# Patient Record
Sex: MALE | Race: WHITE | NOT HISPANIC OR LATINO | Employment: OTHER | URBAN - NONMETROPOLITAN AREA
[De-identification: names, ages, dates, MRNs, and addresses within clinical notes are randomized per-mention and may not be internally consistent; named-entity substitution may affect disease eponyms.]

---

## 2024-03-05 ENCOUNTER — TELEPHONE (OUTPATIENT)
Dept: GASTROENTEROLOGY | Facility: CLINIC | Age: 84
End: 2024-03-05
Payer: MEDICARE

## 2024-03-05 NOTE — TELEPHONE ENCOUNTER
3/5-called patient to scheduled, no answer. I could not leave message due to VM being full. Scanned referral into chart

## 2024-06-19 ENCOUNTER — HOSPITAL ENCOUNTER (OUTPATIENT)
Dept: RADIOLOGY | Facility: HOSPITAL | Age: 84
Discharge: HOME | End: 2024-06-19
Payer: OTHER GOVERNMENT

## 2024-06-19 DIAGNOSIS — R63.4 ABNORMAL WEIGHT LOSS: ICD-10-CM

## 2024-06-19 PROCEDURE — 2550000001 HC RX 255 CONTRASTS

## 2024-06-19 PROCEDURE — A9698 NON-RAD CONTRAST MATERIALNOC: HCPCS

## 2024-06-19 PROCEDURE — 74177 CT ABD & PELVIS W/CONTRAST: CPT

## 2024-06-21 ENCOUNTER — HOSPITAL ENCOUNTER (OUTPATIENT)
Dept: RADIOLOGY | Facility: HOSPITAL | Age: 84
Discharge: HOME | End: 2024-06-21
Payer: OTHER GOVERNMENT

## 2024-06-21 DIAGNOSIS — R13.19 ESOPHAGEAL DYSPHAGIA: Primary | ICD-10-CM

## 2024-06-21 DIAGNOSIS — K21.9 GASTRO-ESOPHAGEAL REFLUX DISEASE WITHOUT ESOPHAGITIS: ICD-10-CM

## 2024-06-21 PROCEDURE — 2500000005 HC RX 250 GENERAL PHARMACY W/O HCPCS

## 2024-06-21 PROCEDURE — 92611 MOTION FLUOROSCOPY/SWALLOW: CPT | Mod: GN | Performed by: SPEECH-LANGUAGE PATHOLOGIST

## 2024-06-21 PROCEDURE — 74230 X-RAY XM SWLNG FUNCJ C+: CPT

## 2024-06-21 NOTE — PROCEDURES
"Speech-Language Pathology    Modified Barium Swallow Study     Patient Name: Genaro Culver  MRN: [id number]  : 1940  Today's Date: 24        Time Calculation  Start Time: 835  Stop Time: 905  Time Calculation (min): 30 min      Recommendations:  Diet: Regular texture diet and thin liquids  Compensatory Strategies: GERD precautions, small bites/sips, alternate liquids and solids, double swallow, intermittent throat clear    Assessment/Impression:    Full detailed SLP/Radiologist Modified Barium Swallow study report, titled \"[place] Modified Barium Swallow Study\", can be found under Imaging in the Chart Review tab.    Pt. Presenting with mild esophageal dysphagia marked by mild esophageal retention with minimal retrograde flow.     SLP Outcome Measures:  EAT 10  My swallowing problem has caused me to lose weight.: 0  My swallowing problem interferes with my ability to go out for meals.: 0  Swallowing liquids takes extra effort.: 1  Swallowing solids takes extra effort.: 1  Swallowing pills takes extra effort.: 1  Swallowing is painful: 0  The pleasure of eating is affected by my swallowing.: 0  When I swallow food sticks in my throat.: 1  I cough when I eat.: 0  Swallowing is stressful: 0  EAT-10 TOTAL SCORE:: 4    Plan:  SLP Plan: No treatment needs identified at this time     Discussed POC: Patient  Discussed Risks/Benefits: Yes  Patient/Caregiver Agreeable: Yes    Pain:   Rating 0-10: 0  Location: NA       Education:   Pt. Educated on results of MBS study, recommended diet and recommended safe swallow strategies.  Verbal understanding given   "

## 2025-01-07 ENCOUNTER — TELEPHONE (OUTPATIENT)
Dept: GASTROENTEROLOGY | Facility: CLINIC | Age: 85
End: 2025-01-07
Payer: MEDICARE

## 2025-01-07 NOTE — TELEPHONE ENCOUNTER
1/7/25-after receiving referral [facility] for [facility] to get [facility]. I called patient. A lady answered and said he was unavailable to come to the phone. I gave her a message for him to call us back. Scanned referral/approved auth into chart.

## 2025-01-08 DIAGNOSIS — R93.3 ABNORMAL FINDINGS ON RADIOLOGICAL EXAMINATION OF GASTROINTESTINAL TRACT: ICD-10-CM

## 2025-02-13 RX ORDER — AMOXICILLIN 250 MG
2 CAPSULE ORAL 2 TIMES DAILY
COMMUNITY
Start: 2024-04-12

## 2025-02-13 RX ORDER — CLOPIDOGREL BISULFATE 75 MG/1
75 TABLET ORAL
COMMUNITY
Start: 2024-02-07

## 2025-02-13 RX ORDER — METHOCARBAMOL 500 MG/1
1 TABLET, FILM COATED ORAL 2 TIMES DAILY PRN
COMMUNITY
Start: 2023-10-30

## 2025-02-13 RX ORDER — BUDESONIDE AND FORMOTEROL FUMARATE DIHYDRATE 160; 4.5 UG/1; UG/1
2 AEROSOL RESPIRATORY (INHALATION) 2 TIMES DAILY
COMMUNITY

## 2025-02-13 RX ORDER — FLUTICASONE PROPIONATE 50 MCG
SPRAY, SUSPENSION (ML) NASAL
COMMUNITY
Start: 2023-09-27

## 2025-02-13 RX ORDER — FINASTERIDE 5 MG/1
5 TABLET, FILM COATED ORAL
COMMUNITY
Start: 2024-04-12

## 2025-02-13 RX ORDER — RIVASTIGMINE 4.6 MG/24H
PATCH, EXTENDED RELEASE TRANSDERMAL
COMMUNITY
Start: 2024-04-12

## 2025-02-13 RX ORDER — CALC/MAG/B COMPLEX/D3/HERB 61
30 TABLET ORAL
COMMUNITY
Start: 2024-04-12

## 2025-02-13 RX ORDER — PROCHLORPERAZINE MALEATE 10 MG
10 TABLET ORAL
COMMUNITY
Start: 2024-04-12

## 2025-02-13 RX ORDER — ALBUTEROL SULFATE 90 UG/1
INHALANT RESPIRATORY (INHALATION)
COMMUNITY
Start: 2023-10-30

## 2025-02-13 RX ORDER — VIT A/VIT C/VIT E/ZINC/COPPER 4296-226
1 CAPSULE ORAL 2 TIMES DAILY
COMMUNITY
Start: 2023-06-28

## 2025-02-13 RX ORDER — ONDANSETRON 4 MG/1
4 TABLET, ORALLY DISINTEGRATING ORAL EVERY 8 HOURS PRN
COMMUNITY
Start: 2022-11-10

## 2025-02-13 RX ORDER — SUCRALFATE 1 G/10ML
SUSPENSION ORAL
COMMUNITY
Start: 2024-03-13

## 2025-02-13 RX ORDER — POLYETHYLENE GLYCOL 3350 17 G/17G
POWDER, FOR SOLUTION ORAL
COMMUNITY
Start: 2024-04-12

## 2025-02-13 RX ORDER — TAMSULOSIN HYDROCHLORIDE 0.4 MG/1
0.8 CAPSULE ORAL
COMMUNITY
Start: 2024-04-12

## 2025-02-13 RX ORDER — ATORVASTATIN CALCIUM 80 MG/1
1 TABLET, FILM COATED ORAL NIGHTLY
COMMUNITY
Start: 2024-04-12

## 2025-02-13 RX ORDER — ACETAMINOPHEN 500 MG
5 TABLET ORAL
COMMUNITY

## 2025-02-13 RX ORDER — ACETAMINOPHEN 325 MG/1
650 TABLET ORAL
COMMUNITY
Start: 2024-04-12

## 2025-02-13 RX ORDER — ISOSORBIDE MONONITRATE 30 MG/1
30 TABLET, EXTENDED RELEASE ORAL
COMMUNITY
Start: 2024-02-26

## 2025-02-13 RX ORDER — FLUTICASONE PROPIONATE AND SALMETEROL 250; 50 UG/1; UG/1
POWDER RESPIRATORY (INHALATION)
COMMUNITY
Start: 2023-11-29

## 2025-02-13 RX ORDER — GABAPENTIN 100 MG/1
CAPSULE ORAL
COMMUNITY

## 2025-02-13 RX ORDER — ASPIRIN 81 MG/1
81 TABLET ORAL
COMMUNITY

## 2025-02-13 RX ORDER — RABEPRAZOLE SODIUM 20 MG/1
TABLET, DELAYED RELEASE ORAL
COMMUNITY

## 2025-02-13 RX ORDER — NITROGLYCERIN 0.4 MG/1
0.4 TABLET SUBLINGUAL
COMMUNITY
Start: 2023-09-13

## 2025-02-13 RX ORDER — LORATADINE 10 MG/1
10 TABLET ORAL
COMMUNITY
Start: 2023-10-20

## 2025-02-19 ENCOUNTER — HOSPITAL ENCOUNTER (OUTPATIENT)
Dept: GASTROENTEROLOGY | Facility: CLINIC | Age: 85
Discharge: HOME | End: 2025-02-19
Payer: OTHER GOVERNMENT

## 2025-02-19 VITALS
BODY MASS INDEX: 20.97 KG/M2 | HEART RATE: 55 BPM | DIASTOLIC BLOOD PRESSURE: 68 MMHG | HEIGHT: 65 IN | RESPIRATION RATE: 16 BRPM | TEMPERATURE: 96.9 F | SYSTOLIC BLOOD PRESSURE: 120 MMHG | WEIGHT: 125.88 LBS | OXYGEN SATURATION: 95 %

## 2025-02-19 DIAGNOSIS — R93.3 ABNORMAL FINDINGS ON RADIOLOGICAL EXAMINATION OF GASTROINTESTINAL TRACT: ICD-10-CM

## 2025-02-19 PROCEDURE — 45378 DIAGNOSTIC COLONOSCOPY: CPT | Performed by: INTERNAL MEDICINE

## 2025-02-19 PROCEDURE — 3700000012 HC SEDATION LEVEL 5+ TIME - INITIAL 15 MINUTES 5/> YEARS: Performed by: INTERNAL MEDICINE

## 2025-02-19 PROCEDURE — G0121 COLON CA SCRN NOT HI RSK IND: HCPCS | Performed by: INTERNAL MEDICINE

## 2025-02-19 PROCEDURE — 2500000004 HC RX 250 GENERAL PHARMACY W/ HCPCS (ALT 636 FOR OP/ED): Performed by: INTERNAL MEDICINE

## 2025-02-19 PROCEDURE — 99152 MOD SED SAME PHYS/QHP 5/>YRS: CPT | Performed by: INTERNAL MEDICINE

## 2025-02-19 PROCEDURE — 7100000009 HC PHASE TWO TIME - INITIAL BASE CHARGE: Performed by: INTERNAL MEDICINE

## 2025-02-19 PROCEDURE — 7100000010 HC PHASE TWO TIME - EACH INCREMENTAL 1 MINUTE: Performed by: INTERNAL MEDICINE

## 2025-02-19 RX ORDER — MEPERIDINE HYDROCHLORIDE 25 MG/ML
INJECTION INTRAMUSCULAR; INTRAVENOUS; SUBCUTANEOUS AS NEEDED
Status: COMPLETED | OUTPATIENT
Start: 2025-02-19 | End: 2025-02-19

## 2025-02-19 RX ORDER — MIDAZOLAM HYDROCHLORIDE 5 MG/ML
INJECTION, SOLUTION INTRAMUSCULAR; INTRAVENOUS AS NEEDED
Status: COMPLETED | OUTPATIENT
Start: 2025-02-19 | End: 2025-02-19

## 2025-02-19 RX ORDER — SODIUM CHLORIDE, SODIUM LACTATE, POTASSIUM CHLORIDE, CALCIUM CHLORIDE 600; 310; 30; 20 MG/100ML; MG/100ML; MG/100ML; MG/100ML
75 INJECTION, SOLUTION INTRAVENOUS CONTINUOUS
Status: DISCONTINUED | OUTPATIENT
Start: 2025-02-19 | End: 2025-02-20 | Stop reason: HOSPADM

## 2025-02-19 RX ADMIN — SODIUM CHLORIDE, POTASSIUM CHLORIDE, SODIUM LACTATE AND CALCIUM CHLORIDE 75 ML/HR: 600; 310; 30; 20 INJECTION, SOLUTION INTRAVENOUS at 08:45

## 2025-02-19 RX ADMIN — MEPERIDINE HYDROCHLORIDE 25 MG: 25 INJECTION INTRAMUSCULAR; INTRAVENOUS; SUBCUTANEOUS at 09:28

## 2025-02-19 RX ADMIN — MEPERIDINE HYDROCHLORIDE 25 MG: 25 INJECTION INTRAMUSCULAR; INTRAVENOUS; SUBCUTANEOUS at 09:19

## 2025-02-19 RX ADMIN — MIDAZOLAM HYDROCHLORIDE 1 MG: 5 INJECTION, SOLUTION INTRAMUSCULAR; INTRAVENOUS at 09:18

## 2025-02-19 RX ADMIN — MIDAZOLAM HYDROCHLORIDE 0.5 MG: 5 INJECTION, SOLUTION INTRAMUSCULAR; INTRAVENOUS at 09:22

## 2025-02-19 RX ADMIN — MIDAZOLAM HYDROCHLORIDE 1 MG: 5 INJECTION, SOLUTION INTRAMUSCULAR; INTRAVENOUS at 09:33

## 2025-02-19 ASSESSMENT — PAIN SCALES - GENERAL
PAINLEVEL_OUTOF10: 0 - NO PAIN

## 2025-02-19 ASSESSMENT — PAIN - FUNCTIONAL ASSESSMENT
PAIN_FUNCTIONAL_ASSESSMENT: 0-10

## 2025-02-19 ASSESSMENT — COLUMBIA-SUICIDE SEVERITY RATING SCALE - C-SSRS
6. HAVE YOU EVER DONE ANYTHING, STARTED TO DO ANYTHING, OR PREPARED TO DO ANYTHING TO END YOUR LIFE?: NO
1. IN THE PAST MONTH, HAVE YOU WISHED YOU WERE DEAD OR WISHED YOU COULD GO TO SLEEP AND NOT WAKE UP?: NO
2. HAVE YOU ACTUALLY HAD ANY THOUGHTS OF KILLING YOURSELF?: NO

## 2025-02-19 NOTE — H&P
History Of Present Illness  Genaro Culver is a 84 y.o. male presenting with colon cancer screening.     Past Medical History  Past Medical History:   Diagnosis Date    Chronic back pain     COPD (chronic obstructive pulmonary disease) (Multi)     Coronary artery disease     Dementia     GERD (gastroesophageal reflux disease)     Hyperlipidemia     Hypertension     Sleep apnea      Surgical History  Past Surgical History:   Procedure Laterality Date    CATARACT EXTRACTION W/  INTRAOCULAR LENS IMPLANT      CHOLECYSTECTOMY      COLONOSCOPY      CORONARY ANGIOPLASTY WITH STENT PLACEMENT      SHOULDER SURGERY       Social History  He reports that he has quit smoking. His smoking use included cigarettes. He does not have any smokeless tobacco history on file. He reports that he does not drink alcohol. No history on file for drug use.    Family History  No family history on file.     Allergies  Allergies   Allergen Reactions    Ticagrelor Shortness of breath    Zonisamide Other     aggitation and hallucinations    Codeine Nausea/vomiting     Other reaction(s): Vomiting    Donepezil GI Upset    Nsaids (Non-Steroidal Anti-Inflammatory Drug) GI Upset and Unknown     Review of Systems  Pre-sedation Evaluation:  ASA Classification - ASA 2 - Patient with mild systemic disease with no functional limitations  Mallampati Score - II (hard and soft palate, upper portion of tonsils and uvula visible)    Physical Exam  Vitals and nursing note reviewed.   Constitutional:       Appearance: Normal appearance.   HENT:      Head: Normocephalic.      Mouth/Throat:      Mouth: Mucous membranes are moist.      Pharynx: Oropharynx is clear.   Eyes:      Pupils: Pupils are equal, round, and reactive to light.   Cardiovascular:      Rate and Rhythm: Normal rate and regular rhythm.      Heart sounds: Normal heart sounds.   Pulmonary:      Effort: Pulmonary effort is normal.      Breath sounds: Normal breath sounds.   Abdominal:      General:  "Abdomen is flat. Bowel sounds are normal.      Palpations: Abdomen is soft.   Musculoskeletal:         General: Normal range of motion.      Cervical back: Normal range of motion and neck supple.   Skin:     General: Skin is warm and dry.   Neurological:      General: No focal deficit present.      Mental Status: He is alert and oriented to person, place, and time.   Psychiatric:         Mood and Affect: Mood normal.         Behavior: Behavior normal.          Last Recorded Vitals  Blood pressure 129/78, pulse 54, temperature 36.3 °C (97.3 °F), temperature source Temporal, resp. rate 18, height 1.651 m (5' 5\"), weight 57.1 kg (125 lb 14.1 oz), SpO2 94%.     Assessment/Plan   Problem List Items Addressed This Visit    None  Visit Diagnoses       Abnormal findings on radiological examination of gastrointestinal tract        Relevant Orders    Colonoscopy Screening; Average Risk Patient               PTA/Current Medications:  (Not in a hospital admission)    Current Outpatient Medications   Medication Sig Dispense Refill    acetaminophen (Tylenol) 325 mg tablet Take 2 tablets (650 mg) by mouth.      albuterol 90 mcg/actuation inhaler Inhale.      apixaban (Eliquis) 5 mg tablet Take 1 tablet (5 mg) by mouth 2 times a day.      atorvastatin (Lipitor) 80 mg tablet Take 1 tablet (80 mg) by mouth once daily at bedtime.      clopidogrel (Plavix) 75 mg tablet Take 1 tablet (75 mg) by mouth.      finasteride (Proscar) 5 mg tablet Take 1 tablet (5 mg) by mouth.      fluticasone (Flonase) 50 mcg/actuation nasal spray Administer into affected nostril(s).      fluticasone propion-salmeteroL (Advair Diskus) 250-50 mcg/dose diskus inhaler Inhale.      isosorbide mononitrate ER (Imdur) 30 mg 24 hr tablet Take 1 tablet (30 mg) by mouth once daily.      lansoprazole (Prevacid) 15 mg DR capsule Take 2 capsules (30 mg) by mouth.      loratadine (Claritin) 10 mg tablet Take 1 tablet (10 mg) by mouth.      methocarbamol (Robaxin) 500 mg " tablet Take 1 tablet (500 mg) by mouth 2 times a day as needed.      nitroglycerin (Nitrostat) 0.4 mg SL tablet Place 1 tablet (0.4 mg) under the tongue.      ondansetron ODT (Zofran-ODT) 4 mg disintegrating tablet Dissolve 1 tablet (4 mg) in the mouth every 8 hours if needed.      polyethylene glycol (Glycolax, Miralax) 17 gram/dose powder Mix of powder and drink.      prochlorperazine (Compazine) 10 mg tablet Take 1 tablet (10 mg) by mouth.      sennosides-docusate sodium (Lilibeth-Colace) 8.6-50 mg tablet Take 2 tablets by mouth 2 times a day.      sucralfate (Carafate) 100 mg/mL suspension Take by mouth.      tamsulosin (Flomax) 0.4 mg 24 hr capsule Take 2 capsules (0.8 mg) by mouth.      vitamins A,C,E-zinc-copper (ICaps AREDS) 4,296 mcg-226 mg-90 mg capsule Take 1 capsule by mouth 2 times a day.      aspirin 81 mg EC tablet Take 1 tablet (81 mg) by mouth once daily.      bisacodyL 5 mg tablet Take 5 mg by mouth.      budesonide-formoteroL (Symbicort) 160-4.5 mcg/actuation inhaler Inhale 2 puffs twice a day.      gabapentin (Neurontin) 100 mg capsule take 2 capsules by mouth in the morning      melatonin 5 mg tablet Take 1 tablet (5 mg) by mouth once daily.      RABEprazole (Aciphex) EC tablet TAKE 1 TABLET BY MOUTH TWICE DAILY 30 MINUTES PRIOR TO BREAKFAST AND 30 MINUTES BEFORE DINNER      rivastigmine (Exelon) 4.6 mg/24 hour Place on the skin. (Patient not taking: Reported on 2/19/2025)       No current facility-administered medications for this encounter.     Levar Hagen DO

## 2025-02-19 NOTE — DISCHARGE INSTRUCTIONS
Patient Instructions after a Colonoscopy      The anesthetics, sedatives or narcotics which were given to you today will be acting in your body for the next 24 hours, so you might feel a little sleepy or groggy.  This feeling should slowly wear off. Carefully read and follow the instructions.     You received sedation today:  - Do not drive or operate any machinery or power tools of any kind.   - No alcoholic beverages today, not even beer or wine.  - Do not make any important decisions or sign any legal documents.  - No over the counter medications that contain alcohol or that may cause drowsiness.  - Do not make any important decisions or sign any legal documents.  - Make sure you have someone with you for first 24 hours.    While it is common to experience mild to moderate abdominal distention, gas, or belching after your procedure, if any of these symptoms occur following discharge from the GI Lab or within one week of having your procedure, call the [facility] to be advised whether a visit to your nearest Urgent Care or Emergency Department is indicated.  Take this paper with you if you go.     - If you develop an allergic reaction to the medications that were given during your procedure such as difficulty breathing, rash, hives, severe nausea, vomiting or lightheadedness.  - If you experience chest pain, shortness of breath, severe abdominal pain, fevers and chills.  -If you develop signs and symptoms of bleeding such as blood in your spit, if your stools turn black, tarry, or bloody  - If you have not urinated within 8 hours following your procedure.  - If your IV site becomes painful, red, inflamed, or looks infected.    If you received a biopsy/polypectomy/sphincterotomy the following instructions apply below:    __ Do not use Aspirin containing products, non-steroidal medications or anti-coagulants for one week following your procedure. (Examples of these types of medications are: Advil,  Arthrotec, Aleve, Coumadin, Ecotrin, Heparin, Ibuprofen, Indocin, Motrin, Naprosyn, Nuprin, Plavix, Vioxx, and Voltarin, or their generic forms.  This list is not all-inclusive.  Check with your physician or pharmacist before resuming medications.)   __ Eat a soft diet today.  Avoid foods that are poorly digested for the next 24 hours.  These foods would include: nuts, beans, lettuce, red meats, and fried foods. Start with liquids and advance your diet as tolerated, gradually work up to eating solids.   __ Do not have a Barium Study or Enema for one week.    Your physician recommends the additional following instructions:    -You have a contact number available for emergencies. The signs and symptoms of potential delayed complications were discussed with you. You may return to normal activities tomorrow.  -Resume your previous diet.  -Continue your present medications.   -We are waiting for your pathology results.  -Your physician has recommended a repeat colonoscopy (date to be determined after pending pathology results are reviewed) for surveillance based on pathology results.  -The findings and recommendations have been discussed with you.  -The findings and recommendations were discussed with your family.  - Please see Medication Reconciliation Form for new medication/medications prescribed.       If you experience any problems or have any questions following discharge from the GI Lab, please call:        Nurse Signature                                                                        Date___________________                                                                            Patient/Responsible Party Signature                                        Date___________________

## 2025-05-12 ENCOUNTER — OFFICE VISIT (OUTPATIENT)
Dept: URGENT CARE | Facility: CLINIC | Age: 85
End: 2025-05-12
Payer: MEDICARE

## 2025-05-12 VITALS
HEIGHT: 65 IN | OXYGEN SATURATION: 96 % | WEIGHT: 125 LBS | DIASTOLIC BLOOD PRESSURE: 73 MMHG | TEMPERATURE: 97.1 F | BODY MASS INDEX: 20.83 KG/M2 | HEART RATE: 87 BPM | SYSTOLIC BLOOD PRESSURE: 112 MMHG

## 2025-05-12 DIAGNOSIS — N41.9 PROSTATITIS, UNSPECIFIED PROSTATITIS TYPE: Primary | ICD-10-CM

## 2025-05-12 LAB
POC APPEARANCE, URINE: CLEAR
POC BILIRUBIN, URINE: NEGATIVE
POC BLOOD, URINE: NEGATIVE
POC COLOR, URINE: YELLOW
POC GLUCOSE, URINE: NEGATIVE MG/DL
POC KETONES, URINE: NEGATIVE MG/DL
POC LEUKOCYTES, URINE: NEGATIVE
POC NITRITE,URINE: NEGATIVE
POC PH, URINE: 7 PH
POC PROTEIN, URINE: ABNORMAL MG/DL
POC SPECIFIC GRAVITY, URINE: 1.02
POC UROBILINOGEN, URINE: 0.2 EU/DL

## 2025-05-12 PROCEDURE — 81002 URINALYSIS NONAUTO W/O SCOPE: CPT | Performed by: NURSE PRACTITIONER

## 2025-05-12 PROCEDURE — 99213 OFFICE O/P EST LOW 20 MIN: CPT | Performed by: NURSE PRACTITIONER

## 2025-05-12 RX ORDER — CIPROFLOXACIN 250 MG/1
250 TABLET, FILM COATED ORAL 2 TIMES DAILY
Qty: 14 TABLET | Refills: 0 | Status: SHIPPED | OUTPATIENT
Start: 2025-05-12 | End: 2025-05-19

## 2025-05-12 NOTE — PROGRESS NOTES
84 y.o. male presents for evaluation of dysuria that has been waxing waning for the past 2 months.  He does have some mild suprapubic tenderness at times.  Patient states he has a history of prostatitis and this feels similar.  States he does have difficulty with starting his stream and feels like he does not fully empty his bladder all the time.  States that he does not drink water as it upsets his stomach.  Denies hematuria, flank pain, nausea, vomiting, diarrhea, fever, fatigue, body aches or any other associated symptom or complaint.  No OTC meds symptom management.      Vitals:    05/12/25 1035   BP: 112/73   Pulse: 87   Temp: 36.2 °C (97.1 °F)   SpO2: 96%       RX Allergies[1]    Medication Documentation Review Audit       Reviewed by Leslie Boston RN (Registered Nurse) on 02/19/25 at 0824      Medication Order Taking? Sig Documenting Provider Last Dose Status   acetaminophen (Tylenol) 325 mg tablet [id number] Yes Take 2 tablets (650 mg) by mouth. Manfred Zimmer MD 2/17/2025 Active   albuterol 90 mcg/actuation inhaler [id number] Yes Inhale. Historical Provider, MD Unknown Active   apixaban (Eliquis) 5 mg tablet [id number] Yes Take 1 tablet (5 mg) by mouth 2 times a day. Manfred Provider, MD 2/15/2025 Active   aspirin 81 mg EC tablet [id number] No Take 1 tablet (81 mg) by mouth once daily. Manfred Zimmer MD 2/17/2025 Active   atorvastatin (Lipitor) 80 mg tablet [id number] Yes Take 1 tablet (80 mg) by mouth once daily at bedtime. Manfred Zimmer MD 2/17/2025 Active   bisacodyL 5 mg tablet [id number] No Take 5 mg by mouth. Manfred Zimmer MD 2/17/2025 Active   budesonide-formoteroL (Symbicort) 160-4.5 mcg/actuation inhaler [id number] No Inhale 2 puffs twice a day. Manfred Zimmer MD 2/17/2025 Active   clopidogrel (Plavix) 75 mg tablet [id number] Yes Take 1 tablet (75 mg) by mouth. Manfred Zimmre MD 2/17/2025 Active   finasteride (Proscar) 5 mg tablet [id number] Yes Take 1 tablet (5  mg) by mouth. Manfred Zimmer MD 2/17/2025 Active   fluticasone (Flonase) 50 mcg/actuation nasal spray [id number] Yes Administer into affected nostril(s). Manfred Zimmer MD 2/17/2025 Active   fluticasone propion-salmeteroL (Advair Diskus) 250-50 mcg/dose diskus inhaler [id number] Yes Inhale. Historical Provider, MD  Active   gabapentin (Neurontin) 100 mg capsule [id number] No take 2 capsules by mouth in the morning Manfred Zimmer MD 2/17/2025 Active   isosorbide mononitrate ER (Imdur) 30 mg 24 hr tablet [id number] Yes Take 1 tablet (30 mg) by mouth once daily. Manfred Zimmer MD 2/17/2025 Active   lansoprazole (Prevacid) 15 mg DR capsule [id number] Yes Take 2 capsules (30 mg) by mouth. Manfred Zimmer MD 2/17/2025 Active   loratadine (Claritin) 10 mg tablet [id number] Yes Take 1 tablet (10 mg) by mouth. Manfred Zimmer MD 2/17/2025 Active   melatonin 5 mg tablet [id number] No Take 1 tablet (5 mg) by mouth once daily. Manfred Zimmer MD 2/17/2025 Active   methocarbamol (Robaxin) 500 mg tablet [id number] Yes Take 1 tablet (500 mg) by mouth 2 times a day as needed. Manfred Zimmer MD 2/17/2025 Active   nitroglycerin (Nitrostat) 0.4 mg SL tablet [id number] Yes Place 1 tablet (0.4 mg) under the tongue. Historical Provider, MD Unknown Active   ondansetron ODT (Zofran-ODT) 4 mg disintegrating tablet [id number] Yes Dissolve 1 tablet (4 mg) in the mouth every 8 hours if needed. Manfred Provider, MD 2/18/2025 Active   polyethylene glycol (Glycolax, Miralax) 17 gram/dose powder [id number] Yes Mix of powder and drink. Historical Provider, MD  Active   prochlorperazine (Compazine) 10 mg tablet [id number] Yes Take 1 tablet (10 mg) by mouth. Manfred Provider, MD Unknown Active   RABEprazole (Aciphex) EC tablet [id number] No TAKE 1 TABLET BY MOUTH TWICE DAILY 30 MINUTES PRIOR TO BREAKFAST AND 30 MINUTES BEFORE DINNER Historical Provider, MD 2/17/2025 Active   rivastigmine (Exelon) 4.6 mg/24 hour  [id number] No Place on the skin.   Patient not taking: Reported on 2/19/2025    Historical Provider, MD Not Taking Active   sennosides-docusate sodium (Lilibeth-Colace) 8.6-50 mg tablet [id number] Yes Take 2 tablets by mouth 2 times a day. Historical Provider, MD  Active   sucralfate (Carafate) 100 mg/mL suspension [id number] Yes Take by mouth. Historical Provider, MD 2/17/2025 Active   tamsulosin (Flomax) 0.4 mg 24 hr capsule [id number] Yes Take 2 capsules (0.8 mg) by mouth. Historical Provider, MD 2/17/2025 Active   vitamins A,C,E-zinc-copper (ICaps AREDS) 4,296 mcg-226 mg-90 mg capsule [id number] Yes Take 1 capsule by mouth 2 times a day. Historical Provider, MD 2/17/2025 Active                    Medical History[2]    Surgical History[3]    ROS  See HPI    Physical Exam  Vitals and nursing note reviewed.   Constitutional:       Appearance: Normal appearance.   Abdominal:      General: Bowel sounds are normal. There is no distension.      Palpations: Abdomen is soft.      Tenderness: There is abdominal tenderness in the suprapubic area. There is no right CVA tenderness, left CVA tenderness, guarding or rebound.   Genitourinary:     Penis: Normal.       Testes: Normal.      Rectum: Normal.      Comments: Per patient report  Skin:     General: Skin is warm and dry.   Neurological:      General: No focal deficit present.      Mental Status: He is alert and oriented to person, place, and time.   Psychiatric:         Mood and Affect: Mood normal.         Behavior: Behavior normal.       Recent Results (from the past hour)   POCT UA (nonautomated w/o microscopy) manually resulted    Collection Time: 05/12/25 10:56 AM   Result Value Ref Range    POC Color, Urine Yellow Straw, Yellow, Light-Yellow    POC Appearance, Urine Clear Clear    POC Glucose, Urine NEGATIVE NEGATIVE mg/dl    POC Bilirubin, Urine NEGATIVE NEGATIVE    POC Ketones, Urine NEGATIVE NEGATIVE mg/dl    POC Specific Gravity, Urine 1.020 1.005 - 1.035    POC Blood,  Urine NEGATIVE NEGATIVE    POC PH, Urine 7.0 No Reference Range Established PH    POC Protein, Urine TRACE (A) NEGATIVE mg/dl    POC Urobilinogen, Urine 0.2 0.2, 1.0 EU/DL    Poc Nitrite, Urine NEGATIVE NEGATIVE    POC Leukocytes, Urine NEGATIVE NEGATIVE       Assessment/Plan/MDM  Genaro was seen today for uti.  Diagnoses and all orders for this visit:  Prostatitis, unspecified prostatitis type (Primary)  -     ciprofloxacin (Cipro) 250 mg tablet; Take 1 tablet (250 mg) by mouth 2 times a day for 7 days.  -     POCT UA (nonautomated w/o microscopy) manually resulted  -     Urine Culture    Encouraged patient to try warm temperature water with cucumber or another mild fruit to increase water intake if he can tolerate as he reports it upsets his stomach.  Patient's clinical presentation is otherwise unremarkable at this time. Patient is discharged with instructions to follow-up with primary care or seek emergency medical attention for worsening symptoms or any new concerns.    I did personally review Genaro's past medical history, surgical history, social history, as well as family history (when relevant).  In this case, I also oversaw the his drug management by reviewing his medication list, allergy list, as well as the medications that I prescribed during the UC course and/or recommended as an out-patient (including possible OTC medications such as acetaminophen, NSAIDs , etc).    After reviewing the items above, I did look at previous medical documentation, such as recent hospitalizations, office visits, and/or recent consultations with PCP/specialist.                          SDOH:   Another factor that I considered in Genaro's care was his Social Determinants of Health (SDOH). During this UC encounter, he did not have social determinants of health. Those SDOH influencing Genaro's care are: none      Sarath Fontenot CNP  [facility]  [phone]       [1]   Allergies  Allergen Reactions    Ticagrelor  Shortness of breath    Zonisamide Other     aggitation and hallucinations    Codeine Nausea/vomiting     Other reaction(s): Vomiting    Donepezil GI Upset    Nsaids (Non-Steroidal Anti-Inflammatory Drug) GI Upset and Unknown   [2]   Past Medical History:  Diagnosis Date    Chronic back pain     COPD (chronic obstructive pulmonary disease) (Multi)     Coronary artery disease     Dementia     GERD (gastroesophageal reflux disease)     Hyperlipidemia     Hypertension     Sleep apnea    [3]   Past Surgical History:  Procedure Laterality Date    CATARACT EXTRACTION W/  INTRAOCULAR LENS IMPLANT      CHOLECYSTECTOMY      COLONOSCOPY      CORONARY ANGIOPLASTY WITH STENT PLACEMENT      SHOULDER SURGERY

## 2025-05-14 ENCOUNTER — TELEPHONE (OUTPATIENT)
Dept: URGENT CARE | Facility: CLINIC | Age: 85
End: 2025-05-14
Payer: OTHER GOVERNMENT

## 2025-05-14 LAB — BACTERIA UR CULT: NORMAL

## 2025-05-14 NOTE — TELEPHONE ENCOUNTER
Result Communication    No results found from the In Basket message.    11:13 AM      Results were successfully communicated with the Z[facility] and they acknowledged their understanding.    Patient was not improving any and was recommended to follow up with PCP or urology .  Spouse acknowledged recommendation .

## 2025-05-14 NOTE — TELEPHONE ENCOUNTER
Result Communication    Resulted Orders   POCT UA (nonautomated w/o microscopy) manually resulted   Result Value Ref Range    POC Color, Urine Yellow Straw, Yellow, Light-Yellow    POC Appearance, Urine Clear Clear    POC Glucose, Urine NEGATIVE NEGATIVE mg/dl    POC Bilirubin, Urine NEGATIVE NEGATIVE    POC Ketones, Urine NEGATIVE NEGATIVE mg/dl    POC Specific Gravity, Urine 1.020 1.005 - 1.035    POC Blood, Urine NEGATIVE NEGATIVE    POC PH, Urine 7.0 No Reference Range Established PH    POC Protein, Urine TRACE (A) NEGATIVE mg/dl    POC Urobilinogen, Urine 0.2 0.2, 1.0 EU/DL    Poc Nitrite, Urine NEGATIVE NEGATIVE    POC Leukocytes, Urine NEGATIVE NEGATIVE   Urine Culture   Result Value Ref Range    CULTURE, URINE, ROUTINE SEE NOTE       Comment:          CULTURE, URINE, ROUTINE         Micro Number:      [id number]    Test Status:       Final    Specimen Source:   Urine    Specimen Quality:  Adequate    Result:            No Growth         11:09 AM      Results were successfully communicated with the Zuleyka, spouse and they acknowledged their understanding.

## 2025-07-03 ENCOUNTER — TRANSCRIBE ORDERS (OUTPATIENT)
Dept: CARDIAC REHAB | Facility: HOSPITAL | Age: 85
End: 2025-07-03
Payer: OTHER GOVERNMENT

## 2025-07-03 DIAGNOSIS — Z95.5 PRESENCE OF STENT IN CORONARY ARTERY IN PATIENT WITH CORONARY ARTERY DISEASE: ICD-10-CM

## 2025-07-03 DIAGNOSIS — I21.02 ST ELEVATION MYOCARDIAL INFARCTION INVOLVING LEFT ANTERIOR DESCENDING (LAD) CORONARY ARTERY (MULTI): ICD-10-CM

## 2025-07-03 DIAGNOSIS — I25.10 PRESENCE OF STENT IN CORONARY ARTERY IN PATIENT WITH CORONARY ARTERY DISEASE: ICD-10-CM

## 2025-07-09 ENCOUNTER — CLINICAL SUPPORT (OUTPATIENT)
Dept: CARDIAC REHAB | Facility: HOSPITAL | Age: 85
End: 2025-07-09
Payer: OTHER GOVERNMENT

## 2025-07-09 VITALS — HEIGHT: 63 IN | WEIGHT: 127.8 LBS | BODY MASS INDEX: 22.64 KG/M2

## 2025-07-09 DIAGNOSIS — Z95.5 PRESENCE OF STENT IN CORONARY ARTERY IN PATIENT WITH CORONARY ARTERY DISEASE: ICD-10-CM

## 2025-07-09 DIAGNOSIS — I25.10 PRESENCE OF STENT IN CORONARY ARTERY IN PATIENT WITH CORONARY ARTERY DISEASE: ICD-10-CM

## 2025-07-09 DIAGNOSIS — I21.02 ST ELEVATION MYOCARDIAL INFARCTION INVOLVING LEFT ANTERIOR DESCENDING (LAD) CORONARY ARTERY (MULTI): ICD-10-CM

## 2025-07-09 ASSESSMENT — PATIENT HEALTH QUESTIONNAIRE - PHQ9
SUM OF ALL RESPONSES TO PHQ9 QUESTIONS 1 & 2: 2
5. POOR APPETITE OR OVEREATING: SEVERAL DAYS
2. FEELING DOWN, DEPRESSED OR HOPELESS: NOT AT ALL
7. TROUBLE CONCENTRATING ON THINGS, SUCH AS READING THE NEWSPAPER OR WATCHING TELEVISION: MORE THAN HALF THE DAYS
9. THOUGHTS THAT YOU WOULD BE BETTER OFF DEAD, OR OF HURTING YOURSELF: NOT AT ALL
1. LITTLE INTEREST OR PLEASURE IN DOING THINGS: MORE THAN HALF THE DAYS
SUM OF ALL RESPONSES TO PHQ QUESTIONS 1-9: 7
SUM OF ALL RESPONSES TO PHQ QUESTIONS 1-9: 7
8. MOVING OR SPEAKING SO SLOWLY THAT OTHER PEOPLE COULD HAVE NOTICED. OR THE OPPOSITE, BEING SO FIGETY OR RESTLESS THAT YOU HAVE BEEN MOVING AROUND A LOT MORE THAN USUAL: NOT AT ALL
6. FEELING BAD ABOUT YOURSELF - OR THAT YOU ARE A FAILURE OR HAVE LET YOURSELF OR YOUR FAMILY DOWN: NOT AT ALL
3. TROUBLE FALLING OR STAYING ASLEEP OR SLEEPING TOO MUCH: NOT AT ALL
4. FEELING TIRED OR HAVING LITTLE ENERGY: MORE THAN HALF THE DAYS

## 2025-07-09 ASSESSMENT — DUKE ACTIVITY SCORE INDEX (DASI)
CAN YOU PARTICIPATE IN STRENOUS SPORTS LIKE SWIMMING, SINGLES TENNIS, FOOTBALL, BASKETBALL, OR SKIING: NO
CAN YOU RUN A SHORT DISTANCE: NO
CAN YOU HAVE SEXUAL RELATIONS: NO
CAN YOU TAKE CARE OF YOURSELF (EAT, DRESS, BATHE, OR USE TOILET): YES
DASI METS SCORE: 4.1
CAN YOU CLIMB A FLIGHT OF STAIRS OR WALK UP A HILL: NO
CAN YOU DO LIGHT WORK AROUND THE HOUSE LIKE DUSTING OR WASHING DISHES: YES
CAN YOU WALK INDOORS, SUCH AS AROUND YOUR HOUSE: YES
CAN YOU DO HEAVY WORK AROUND THE HOUSE LIKE SCRUBBING FLOORS OR LIFTING AND MOVING HEAVY FURNITURE: NO
CAN YOU DO MODERATE WORK AROUND THE HOUSE LIKE VACUUMING, SWEEPING FLOORS OR CARRYING GROCERIES: YES
CAN YOU PARTICIPATE IN MODERATE RECREATIONAL ACTIVITIES LIKE GOLF, BOWLING, DANCING, DOUBLES TENNIS OR THROWING A BASEBALL OR FOOTBALL: NO
CAN YOU WALK A BLOCK OR TWO ON LEVEL GROUND: NO
CAN YOU DO YARD WORK LIKE RAKING LEAVES, WEEDING OR PUSHING A MOWER: NO
TOTAL_SCORE: 10.7

## 2025-07-09 NOTE — PROGRESS NOTES
"  Cardiac Rehabilitation Initial Treatment Plan    Name: Genaro Culver  Medical Record Number: [id number]  YOB: 1940  Age: 84 y.o.    Today’s Date: 07/10/2025  Primary Care Physician: Noman Roy MD  Referring Physician: Jaime Pina MD   Program Location: [address]  Primary Diagnosis:   I21.02 ST elevation myocardial infarction involving LAD  I21.3 ST elevation myocardial infarction of unspecified site    Onset/Date of Diagnosis: 06/09/25  Initial Assessment, not yet started program.    AACVPR Risk Stratification: Moderate     Falls Risk: Low  Psychosocial Assessment     Pre PHQ-9: 7  Post:     Sent PH-Q 9 to MD if score > 20: No; score < 20    Pt reported/currently experiencing stress: Yes; Stress; Severity: mild 2/10 and Anxiety; Severity: moderate 5/10  Patient uses stress management skills: No   History of: no history of anxiety or depression  Currently seeing a mental health provider: No  Social Support: Yes, Whom:Wife   Quality of Life Survey: SF-36   SF-36 Pre Post   Physical Component Score 24.18 TBD   Mental Component Score 53.45 TBD     Learning Assessment:  Learning assessment/barriers: Cognitive  Preferred learning method: Auditory, Visual, Reading handout, and Writing handout  Barriers: Cognitive limitations  Comments:  Patient has a history of dementia   Stages of Change:Action    Psychosocial Plan    Goal Status: Initial Assessment; goals not yet started    Psychosocial Goals:   Decrease phq-9 score by 2 points by decreasing the amount of days the patient has little interest or pleasure in doing things Decrease the amount of days the patient has little energy from more than half the days and decrease poor appetite from nearly every day to a few or none. Patient scored in \"mild category, score of 7).  Learn and utilize stress management skills and apply them to reported stressors while in the program.  Question on new or current psychological issues " "at least every 30 days.  Improve PCS and MCS scores by at least 3 points by discharge.  Educate patient on ways to reduce stress and the importance of stress management in regards to cardiac health.  Provide 1:1 emotional support as needed and facilitate peer support within the context of the other phase 2 patients.       Psychosocial Interventions/Education:   Encourage patient to complete all emotional health and stress reduction activities and worksheets provided throughout the program. Patient verbalized understanding. 07/09/25        Nutrition Assessment:    Hyperlipidemia: Yes    Lipids:   Lipid Panel Drawn 09/18/24, normal     Current Dietary Guidelines: PYP score 51/96, \"A more healthful dietary pattern, with some room for improvement\".   Barriers to dietary change: no    Diet Habit Survey: Picture Your Plate  Pre: 51/96  Post: To be done at discharge.    Diabetes Assessment    History of Diabetes: No    Weight Management  Height: 160 cm (5' 3\")  Weight: 58 kg (127 lb 12.8 oz)  BMI (Calculated): 22.64    Nutrition Plan    Goal Status: Initial Assessment; goals not yet started    Nutrition Goals:   Increase PYP score to greater than 55 by discharge. (On admission , patient scored 51 which is in category, \"A more healthful dietary pattern, with some room for improvement.\"  Provide education on nutritional aspects related to cardiac health and discuss dietary improvements while in the program.   Lose 1/2 inch from waist by discharge. ( 33.5\" On admission).  Discuss PYP scores within the first 12 sessions of the program.      Nutrition Interventions/Education:   Informed patient that nutritional topics will be discussed including following of the Mediterranean Diet. Patient verbalized understanding. 07/09/25  2.   Explained to patient that education will be completed with book and workbook and expected for completion.  Discussed with patient that there will be several different topics over the 12 weeks. Patient " "acknowledged understanding. 07/09/25      Exercise Assessment    No  Mode: NA  Frequency: NA  Duration: NA    Exercise Prescription     Exercise Prescription based on: 6 Minute Walk Test     Frequency:  3 days/week   Mode: Aerobic   Duration: >30 total aerobic minutes   Intensity: RPE <15  Target HR:  rest + 30  MET Level: 2.27  Patient wears supplemental O2: No     Modality Workload METs Duration (minutes)   1 Pre-Exercise      2 Recumbent Bike 13 gay   2.2 12 :00   3 NuStep 2 gay  2.2 12 :00   4 Treadmill 1.6 mph  2.2 12 :00   5 Weights    5 :00   6 Post-Exercise        Resistance Training: Yes , after 9th session   Home Exercise Prescription given: To be given prior to discharge from program.    Exercise Plan    Goal Status: Initial Assessment; goals not yet started    Exercise Goals:   Increase mets to 4.0 by the end of the program.  Gain independence and confidence with exercise while in the program.  Have a plan to continue exercise after he completes the program.  Increase mets by 1.0 every 6 weeks as tolerated.      Exercise Interventions/Education:   \"What are exactly mets\" handout provided and discussed. Patient understood well. 07/09/25  KARAN handout provided and discussed. Patient verbalized understanding. 07/09/25      Other Core Components/Risk Factor Assessment:    Medication adherence  Current Medications:   Medication Documentation Review Audit       Reviewed by Leslie Boston RN (Registered Nurse) on 02/19/25 at 0824      Medication Order Taking? Sig Documenting Provider Last Dose Status   acetaminophen (Tylenol) 325 mg tablet [id number] Yes Take 2 tablets (650 mg) by mouth. Historical Provider, MD 2/17/2025 Active   albuterol 90 mcg/actuation inhaler [id number] Yes Inhale. Historical Provider, MD Unknown Active   apixaban (Eliquis) 5 mg tablet [id number] Yes Take 1 tablet (5 mg) by mouth 2 times a day. Historical Provider, MD 2/15/2025 Active   aspirin 81 mg EC tablet [id number] No Take 1 tablet " (81 mg) by mouth once daily. Historical Provider, MD 2/17/2025 Active   atorvastatin (Lipitor) 80 mg tablet [id number] Yes Take 1 tablet (80 mg) by mouth once daily at bedtime. Historical Provider, MD 2/17/2025 Active   bisacodyL 5 mg tablet [id number] No Take 5 mg by mouth. Historical Provider, MD 2/17/2025 Active   budesonide-formoteroL (Symbicort) 160-4.5 mcg/actuation inhaler [id number] No Inhale 2 puffs twice a day. Historical Provider, MD 2/17/2025 Active   clopidogrel (Plavix) 75 mg tablet [id number] Yes Take 1 tablet (75 mg) by mouth. Historical Provider, MD 2/17/2025 Active   finasteride (Proscar) 5 mg tablet [id number] Yes Take 1 tablet (5 mg) by mouth. Historical Provider, MD 2/17/2025 Active   fluticasone (Flonase) 50 mcg/actuation nasal spray [id number] Yes Administer into affected nostril(s). Historical Provider, MD 2/17/2025 Active   fluticasone propion-salmeteroL (Advair Diskus) 250-50 mcg/dose diskus inhaler [id number] Yes Inhale. Historical Provider, MD  Active   gabapentin (Neurontin) 100 mg capsule [id number] No take 2 capsules by mouth in the morning Historical Provider, MD 2/17/2025 Active   isosorbide mononitrate ER (Imdur) 30 mg 24 hr tablet [id number] Yes Take 1 tablet (30 mg) by mouth once daily. Historical Provider, MD 2/17/2025 Active   lansoprazole (Prevacid) 15 mg DR capsule [id number] Yes Take 2 capsules (30 mg) by mouth. Historical Provider, MD 2/17/2025 Active   loratadine (Claritin) 10 mg tablet [id number] Yes Take 1 tablet (10 mg) by mouth. Historical Provider, MD 2/17/2025 Active   melatonin 5 mg tablet [id number] No Take 1 tablet (5 mg) by mouth once daily. Historical Provider, MD 2/17/2025 Active   methocarbamol (Robaxin) 500 mg tablet [id number] Yes Take 1 tablet (500 mg) by mouth 2 times a day as needed. Historical Provider, MD 2/17/2025 Active   nitroglycerin (Nitrostat) 0.4 mg SL tablet [id number] Yes Place 1 tablet (0.4 mg) under the tongue. Historical Provider, MD Unknown Active    ondansetron ODT (Zofran-ODT) 4 mg disintegrating tablet [id number] Yes Dissolve 1 tablet (4 mg) in the mouth every 8 hours if needed. Manfred Zimmer MD 2/18/2025 Active   polyethylene glycol (Glycolax, Miralax) 17 gram/dose powder [id number] Yes Mix of powder and drink. Manfred Zimmer MD  Active   prochlorperazine (Compazine) 10 mg tablet [id number] Yes Take 1 tablet (10 mg) by mouth. Manfred Zimmer MD Unknown Active   RABEprazole (Aciphex) EC tablet [id number] No TAKE 1 TABLET BY MOUTH TWICE DAILY 30 MINUTES PRIOR TO BREAKFAST AND 30 MINUTES BEFORE DINNER Manfred Zimmer MD 2/17/2025 Active   rivastigmine (Exelon) 4.6 mg/24 hour [id number] No Place on the skin.   Patient not taking: Reported on 2/19/2025    Manfred Zimmer MD Not Taking Active   sennosides-docusate sodium (Lilibeth-Colace) 8.6-50 mg tablet [id number] Yes Take 2 tablets by mouth 2 times a day. Manfred Zimmer MD  Active   sucralfate (Carafate) 100 mg/mL suspension [id number] Yes Take by mouth. Manfred Zimmer MD 2/17/2025 Active   tamsulosin (Flomax) 0.4 mg 24 hr capsule [id number] Yes Take 2 capsules (0.8 mg) by mouth. Manfred Zimmer MD 2/17/2025 Active   vitamins A,C,E-zinc-copper (ICaps AREDS) 4,296 mcg-226 mg-90 mg capsule [id number] Yes Take 1 capsule by mouth 2 times a day. Manfred Zimmer MD 2/17/2025 Active                               Medication compliance: Yes   Uses pill box/organizer: Yes    Carries medication list: Yes     Blood Pressure Management  History of Hypertension: No   Medication Changes: No   Resting BP:  90/58    Heart Failure Management  Hx of Heart Failure: No    Smoking/Tobacco Assessment  Patient is not a smoker. 07/09/25    Other Core Component Plan    Goal Status: Initial Assessment; goals not yet started    Other Core Component Goals:   Maintain a resting BP of <130/80 while in the program  Increase knowledge test score from  8/15 to 15/15 by discharge.  Monitor blood pressure  "pre, during and post workout.  Meet and discuss knowledge test within the first 12 sessions.       Other Core Component Interventions/Education:   Educated the patient on the importance of carrying and updating medication list. 07/09/25  2.   Handout given, \"Tips on taking medication.\" Patient understood well. 07/09/25  3.   Handouts given, \"Benefits of Cardiac Rehab\" and American Heart Association's, \"What is Cardiac Rehab?\" Patient acknowledged understanding. 07/09/25        Individual Patient Goals:    Get more exercise   Get my heart stronger    Goal Status: Initial Assessment; goals not yet started    Staff Comments:  Patient orientated to the 2:30pm class time. Patient along with help from wife completed all entry paperwork. All entry paperwork will be reviewed with patient over the course of rehab. The patient was able to complete the 6 minute walk with minimal shortness of breath. The patient walked 880 feet at a pace of 1.6 mph and obtained 2.27 mets. Patient tolerated well.     Rehab Staff Signature: Fernanda Apodaca RN        "

## 2025-07-14 ENCOUNTER — CLINICAL SUPPORT (OUTPATIENT)
Dept: CARDIAC REHAB | Facility: HOSPITAL | Age: 85
End: 2025-07-14
Payer: OTHER GOVERNMENT

## 2025-07-14 VITALS — DIASTOLIC BLOOD PRESSURE: 78 MMHG | SYSTOLIC BLOOD PRESSURE: 139 MMHG

## 2025-07-14 DIAGNOSIS — I21.3 ST ELEVATION MYOCARDIAL INFARCTION (STEMI), UNSPECIFIED ARTERY (MULTI): ICD-10-CM

## 2025-07-14 DIAGNOSIS — I21.02 ST ELEVATION MYOCARDIAL INFARCTION INVOLVING LEFT ANTERIOR DESCENDING (LAD) CORONARY ARTERY (MULTI): Primary | ICD-10-CM

## 2025-07-14 PROCEDURE — 93798 PHYS/QHP OP CAR RHAB W/ECG: CPT | Performed by: STUDENT IN AN ORGANIZED HEALTH CARE EDUCATION/TRAINING PROGRAM

## 2025-07-16 ENCOUNTER — APPOINTMENT (OUTPATIENT)
Dept: CARDIAC REHAB | Facility: HOSPITAL | Age: 85
End: 2025-07-16
Payer: OTHER GOVERNMENT

## 2025-07-18 ENCOUNTER — CLINICAL SUPPORT (OUTPATIENT)
Dept: CARDIAC REHAB | Facility: HOSPITAL | Age: 85
End: 2025-07-18
Payer: OTHER GOVERNMENT

## 2025-07-18 VITALS — SYSTOLIC BLOOD PRESSURE: 122 MMHG | DIASTOLIC BLOOD PRESSURE: 82 MMHG

## 2025-07-18 DIAGNOSIS — I21.02 ST ELEVATION MYOCARDIAL INFARCTION INVOLVING LEFT ANTERIOR DESCENDING (LAD) CORONARY ARTERY (MULTI): Primary | ICD-10-CM

## 2025-07-18 DIAGNOSIS — I21.3 ST ELEVATION MYOCARDIAL INFARCTION (STEMI), UNSPECIFIED ARTERY (MULTI): ICD-10-CM

## 2025-07-18 PROCEDURE — 93798 PHYS/QHP OP CAR RHAB W/ECG: CPT | Performed by: STUDENT IN AN ORGANIZED HEALTH CARE EDUCATION/TRAINING PROGRAM

## 2025-07-21 ENCOUNTER — CLINICAL SUPPORT (OUTPATIENT)
Dept: CARDIAC REHAB | Facility: HOSPITAL | Age: 85
End: 2025-07-21
Payer: OTHER GOVERNMENT

## 2025-07-21 VITALS — DIASTOLIC BLOOD PRESSURE: 70 MMHG | SYSTOLIC BLOOD PRESSURE: 123 MMHG

## 2025-07-21 DIAGNOSIS — I21.02 ST ELEVATION MYOCARDIAL INFARCTION INVOLVING LEFT ANTERIOR DESCENDING (LAD) CORONARY ARTERY (MULTI): Primary | ICD-10-CM

## 2025-07-21 DIAGNOSIS — I21.3 ST ELEVATION MYOCARDIAL INFARCTION (STEMI), UNSPECIFIED ARTERY (MULTI): ICD-10-CM

## 2025-07-21 PROCEDURE — 93798 PHYS/QHP OP CAR RHAB W/ECG: CPT | Performed by: STUDENT IN AN ORGANIZED HEALTH CARE EDUCATION/TRAINING PROGRAM

## 2025-07-23 ENCOUNTER — CLINICAL SUPPORT (OUTPATIENT)
Dept: CARDIAC REHAB | Facility: HOSPITAL | Age: 85
End: 2025-07-23
Payer: OTHER GOVERNMENT

## 2025-07-23 VITALS — DIASTOLIC BLOOD PRESSURE: 79 MMHG | SYSTOLIC BLOOD PRESSURE: 131 MMHG

## 2025-07-23 DIAGNOSIS — I21.02 ST ELEVATION MYOCARDIAL INFARCTION INVOLVING LEFT ANTERIOR DESCENDING (LAD) CORONARY ARTERY (MULTI): Primary | ICD-10-CM

## 2025-07-23 DIAGNOSIS — I21.3 ST ELEVATION MYOCARDIAL INFARCTION (STEMI), UNSPECIFIED ARTERY (MULTI): ICD-10-CM

## 2025-07-23 PROCEDURE — 93798 PHYS/QHP OP CAR RHAB W/ECG: CPT | Performed by: STUDENT IN AN ORGANIZED HEALTH CARE EDUCATION/TRAINING PROGRAM

## 2025-07-25 ENCOUNTER — CLINICAL SUPPORT (OUTPATIENT)
Dept: CARDIAC REHAB | Facility: HOSPITAL | Age: 85
End: 2025-07-25
Payer: OTHER GOVERNMENT

## 2025-07-25 VITALS — SYSTOLIC BLOOD PRESSURE: 115 MMHG | DIASTOLIC BLOOD PRESSURE: 63 MMHG

## 2025-07-25 DIAGNOSIS — I21.3 ST ELEVATION MYOCARDIAL INFARCTION (STEMI), UNSPECIFIED ARTERY (MULTI): ICD-10-CM

## 2025-07-25 DIAGNOSIS — I21.02 ST ELEVATION MYOCARDIAL INFARCTION INVOLVING LEFT ANTERIOR DESCENDING (LAD) CORONARY ARTERY (MULTI): Primary | ICD-10-CM

## 2025-07-25 PROCEDURE — 93798 PHYS/QHP OP CAR RHAB W/ECG: CPT | Performed by: STUDENT IN AN ORGANIZED HEALTH CARE EDUCATION/TRAINING PROGRAM

## 2025-07-28 ENCOUNTER — CLINICAL SUPPORT (OUTPATIENT)
Dept: CARDIAC REHAB | Facility: HOSPITAL | Age: 85
End: 2025-07-28
Payer: OTHER GOVERNMENT

## 2025-07-28 VITALS — DIASTOLIC BLOOD PRESSURE: 56 MMHG | SYSTOLIC BLOOD PRESSURE: 105 MMHG

## 2025-07-28 DIAGNOSIS — I21.02 ST ELEVATION MYOCARDIAL INFARCTION INVOLVING LEFT ANTERIOR DESCENDING (LAD) CORONARY ARTERY (MULTI): Primary | ICD-10-CM

## 2025-07-28 DIAGNOSIS — I21.3 ST ELEVATION MYOCARDIAL INFARCTION (STEMI), UNSPECIFIED ARTERY (MULTI): ICD-10-CM

## 2025-07-28 PROCEDURE — 93798 PHYS/QHP OP CAR RHAB W/ECG: CPT | Performed by: STUDENT IN AN ORGANIZED HEALTH CARE EDUCATION/TRAINING PROGRAM

## 2025-07-30 ENCOUNTER — CLINICAL SUPPORT (OUTPATIENT)
Dept: CARDIAC REHAB | Facility: HOSPITAL | Age: 85
End: 2025-07-30
Payer: OTHER GOVERNMENT

## 2025-07-30 VITALS — DIASTOLIC BLOOD PRESSURE: 67 MMHG | SYSTOLIC BLOOD PRESSURE: 133 MMHG

## 2025-07-30 DIAGNOSIS — I21.02 ST ELEVATION MYOCARDIAL INFARCTION INVOLVING LEFT ANTERIOR DESCENDING (LAD) CORONARY ARTERY (MULTI): Primary | ICD-10-CM

## 2025-07-30 DIAGNOSIS — I21.3 ST ELEVATION MYOCARDIAL INFARCTION (STEMI), UNSPECIFIED ARTERY (MULTI): ICD-10-CM

## 2025-07-30 PROCEDURE — 93798 PHYS/QHP OP CAR RHAB W/ECG: CPT | Performed by: STUDENT IN AN ORGANIZED HEALTH CARE EDUCATION/TRAINING PROGRAM

## 2025-08-01 ENCOUNTER — CLINICAL SUPPORT (OUTPATIENT)
Dept: CARDIAC REHAB | Facility: HOSPITAL | Age: 85
End: 2025-08-01
Payer: OTHER GOVERNMENT

## 2025-08-01 VITALS — DIASTOLIC BLOOD PRESSURE: 73 MMHG | SYSTOLIC BLOOD PRESSURE: 108 MMHG

## 2025-08-01 DIAGNOSIS — I21.3 ST ELEVATION MYOCARDIAL INFARCTION (STEMI), UNSPECIFIED ARTERY (MULTI): ICD-10-CM

## 2025-08-01 DIAGNOSIS — I21.02 ST ELEVATION MYOCARDIAL INFARCTION INVOLVING LEFT ANTERIOR DESCENDING (LAD) CORONARY ARTERY (MULTI): Primary | ICD-10-CM

## 2025-08-01 PROCEDURE — 93798 PHYS/QHP OP CAR RHAB W/ECG: CPT | Performed by: STUDENT IN AN ORGANIZED HEALTH CARE EDUCATION/TRAINING PROGRAM

## 2025-08-04 ENCOUNTER — CLINICAL SUPPORT (OUTPATIENT)
Dept: CARDIAC REHAB | Facility: HOSPITAL | Age: 85
End: 2025-08-04
Payer: OTHER GOVERNMENT

## 2025-08-04 VITALS — SYSTOLIC BLOOD PRESSURE: 121 MMHG | DIASTOLIC BLOOD PRESSURE: 55 MMHG

## 2025-08-04 DIAGNOSIS — I21.02 ST ELEVATION MYOCARDIAL INFARCTION INVOLVING LEFT ANTERIOR DESCENDING (LAD) CORONARY ARTERY (MULTI): Primary | ICD-10-CM

## 2025-08-04 DIAGNOSIS — I21.3 ST ELEVATION MYOCARDIAL INFARCTION (STEMI), UNSPECIFIED ARTERY (MULTI): ICD-10-CM

## 2025-08-04 PROCEDURE — 93798 PHYS/QHP OP CAR RHAB W/ECG: CPT | Performed by: STUDENT IN AN ORGANIZED HEALTH CARE EDUCATION/TRAINING PROGRAM

## 2025-08-06 ENCOUNTER — CLINICAL SUPPORT (OUTPATIENT)
Dept: CARDIAC REHAB | Facility: HOSPITAL | Age: 85
End: 2025-08-06
Payer: OTHER GOVERNMENT

## 2025-08-06 VITALS — SYSTOLIC BLOOD PRESSURE: 97 MMHG | DIASTOLIC BLOOD PRESSURE: 59 MMHG

## 2025-08-06 DIAGNOSIS — I21.02 ST ELEVATION MYOCARDIAL INFARCTION INVOLVING LEFT ANTERIOR DESCENDING (LAD) CORONARY ARTERY (MULTI): Primary | ICD-10-CM

## 2025-08-06 DIAGNOSIS — I21.3 ST ELEVATION MYOCARDIAL INFARCTION (STEMI), UNSPECIFIED ARTERY (MULTI): ICD-10-CM

## 2025-08-06 PROCEDURE — 93798 PHYS/QHP OP CAR RHAB W/ECG: CPT | Performed by: STUDENT IN AN ORGANIZED HEALTH CARE EDUCATION/TRAINING PROGRAM

## 2025-08-07 NOTE — PROGRESS NOTES
"  Cardiac Rehabilitation 30 Day Treatment Plan     Name: Genaro Culver  Medical Record Number: [id number]  YOB: 1940  Age: 84 y.o.    Today’s Date: 07/10/2025  Primary Care Physician: Noman Roy MD  Referring Physician: Jaime Pina MD   Program Location: [address]   Session Start Date: 07/14/2025    General  Primary Diagnosis:   I21.02 ST elevation myocardial infarction involving LAD  I21.3 ST elevation myocardial infarction of unspecified site    Onset/Date of Diagnosis: 06/09/25  Session #: 10     AACVPR Risk Stratification: Moderate     Falls Risk: Low  Psychosocial Assessment     Pre PHQ9: 7  Post PHQ9:     Sent PH-Q 9 to MD if score > 20: No; score < 20    Pt reported/currently experiencing stress: Yes; Stress; Severity: mild 2/10 and Anxiety; Severity: moderate 5/10  Patient uses stress management skills: No   History of: no history of anxiety or depression  Currently seeing a mental health provider: No  Social Support: Yes, Whom:Wife   Quality of Life Survey: SF-36   SF-36 Pre Post   Physical Component Score 24.18 TBD   Mental Component Score 53.45 TBD     Learning Assessment:  Learning assessment/barriers: Cognitive  Preferred learning method: Auditory, Visual, Reading handout, and Writing handout  Barriers: Cognitive limitations  Comments: Patient has a history of dementia, unable to retain education   Stages of Change:Action    Psychosocial Plan    Goal Status: In Progress    Psychosocial Goals:   Decrease phq-9 score by 2 points by decreasing the amount of days the patient has little interest or pleasure in doing things Decrease the amount of days the patient has little energy from more than half the days and decrease poor appetite from nearly every day to a few or none. Patient scored in \"mild category, score of 7).  Learn and utilize stress management skills and apply them to reported stressors while in the program.  Question on new or current psychological " "issues at least every 30 days (in progress).   Improve PCS and MCS scores by at least 3 points by discharge (in progress).   Educate patient on ways to reduce stress and the importance of stress management in regards to cardiac health (in progress).   Provide 1:1 emotional support as needed and facilitate peer support within the context of the other phase 2 patients (in progress).        Psychosocial Interventions/Education:   Encourage patient to complete all emotional health and stress reduction activities and worksheets provided throughout the program. Patient verbalized understanding. 07/09/25  Patient reported current Emotional and Stress level at 1 out of 10. 08/04/25  Question patient on new or current psychological issues, none to report at this time. 08/04/25  Provide one on one emotional support as needed.         Nutrition Assessment:    Hyperlipidemia: Yes    Lipids:   Lipid Panel Drawn 09/18/24, normal     Current Dietary Guidelines: PYP score 51/96, \"A more healthful dietary pattern, with some room for improvement\".   Barriers to dietary change: no    Diet Habit Survey: Picture Your Plate  Pre: 51/96  Post: To be done at discharge.    Diabetes Assessment    History of Diabetes: No    Weight Management  Height: 5'3  Weight: 127 lbs  BMI: 22.64  Waist circumference: 85.09 cm     Nutrition Plan    Goal Status: In Progress     Nutrition Goals:   Increase PYP score to greater than 55 by discharge. (On admission , patient scored 51 which is in category, \"A more healthful dietary pattern, with some room for improvement.\"  Provide education on nutritional aspects related to cardiac health and discuss dietary improvements while in the program.   Lose 1/2 inch from waist by discharge. ( 33.5\" On admission).  Discuss PYP scores within the first 12 sessions of the program.      Nutrition Interventions/Education:   Informed patient that nutritional topics will be discussed including following of the Mediterranean " "Diet. Patient verbalized understanding. 07/09/25  2.   Explained to patient that education will be completed with book and workbook and expected for completion.  Discussed with patient that there will be several different topics over the 12 weeks. Patient acknowledged understanding. 07/09/25  3. Hand Outs given to patient on, Mediterranean diet including food pyramid, sample diet, shopping lists, heart healthy eating on a budget, and building a heart healthy plate. Patient acknowledge understanding. 08/04/25  4. Discuss PYP scores within first 6 weeks of program.       Exercise Assessment    Mode: Aerobic  Frequency: 2 Days   Duration: 20 mins    Exercise Prescription     Exercise Prescription based on: 6 Minute Walk Test     Frequency:  3 days/week   Mode: Aerobic   Duration: >30 total aerobic minutes   Intensity: RPE <15  Target HR:  rest + 30  MET Level: 3.5  Patient wears supplemental O2: No     Modality Workload METs Duration (minutes)   1 Pre-Exercise      2 Recumbent Bike 26 gay @ 3% 3.5 12 :00   3 NuStep 44 gay @ 2% 3.5 12 :00   4 Treadmill 1.6 mph @ 2% 2.7 12 :00   5 Weights 3 lbs    5 :00   6 Post-Exercise        Resistance Training: Yes , after 9th session   Home Exercise Prescription given: To be given prior to discharge from program.    Exercise Plan    Goal Status: In progress    Exercise Goals:   Increase mets to 4.0 by the end of the program (in progress).   Gain independence and confidence with exercise while in the program (in progress).   Have a plan to continue exercise after he completes the program (in progress).   Increase mets by 1.0 every 6 weeks as tolerated (in progress).       Exercise Interventions/Education:   \"What are exactly mets\" handout provided and discussed. Patient understood well. 07/09/25  KARAN handout provided and discussed. Patient verbalized understanding. 07/09/25  Patient was given handouts to take home, \"Aerobic Exercise\", and \"Real Benefits of Exercise\". Patient " encouraged to continue home exercise for 20-30 minutes on days when not exercising at cardiac rehab. 08/04/25      Other Core Components/Risk Factor Assessment:    Medication adherence  Current Medications:   Medication Documentation Review Audit       Reviewed by Leslie Boston RN (Registered Nurse) on 02/19/25 at 0824      Medication Order Taking? Sig Documenting Provider Last Dose Status   acetaminophen (Tylenol) 325 mg tablet [id number] Yes Take 2 tablets (650 mg) by mouth. Manfred Provider, MD 2/17/2025 Active   albuterol 90 mcg/actuation inhaler [id number] Yes Inhale. Historical Provider, MD Unknown Active   apixaban (Eliquis) 5 mg tablet [id number] Yes Take 1 tablet (5 mg) by mouth 2 times a day. Manfred Provider, MD 2/15/2025 Active   aspirin 81 mg EC tablet [id number] No Take 1 tablet (81 mg) by mouth once daily. Manfred Provider, MD 2/17/2025 Active   atorvastatin (Lipitor) 80 mg tablet [id number] Yes Take 1 tablet (80 mg) by mouth once daily at bedtime. Manfred Provider, MD 2/17/2025 Active   bisacodyL 5 mg tablet [id number] No Take 5 mg by mouth. Manfred Provider, MD 2/17/2025 Active   budesonide-formoteroL (Symbicort) 160-4.5 mcg/actuation inhaler [id number] No Inhale 2 puffs twice a day. Manfred Provider, MD 2/17/2025 Active   clopidogrel (Plavix) 75 mg tablet [id number] Yes Take 1 tablet (75 mg) by mouth. Manfred Provider, MD 2/17/2025 Active   finasteride (Proscar) 5 mg tablet [id number] Yes Take 1 tablet (5 mg) by mouth. Manfred Provider, MD 2/17/2025 Active   fluticasone (Flonase) 50 mcg/actuation nasal spray [id number] Yes Administer into affected nostril(s). Manfred Provider, MD 2/17/2025 Active   fluticasone propion-salmeteroL (Advair Diskus) 250-50 mcg/dose diskus inhaler [id number] Yes Inhale. Historical Provider, MD  Active   gabapentin (Neurontin) 100 mg capsule [id number] No take 2 capsules by mouth in the morning Manfred Provider, MD 2/17/2025 Active   isosorbide  mononitrate ER (Imdur) 30 mg 24 hr tablet [id number] Yes Take 1 tablet (30 mg) by mouth once daily. Manfred Zimmer MD 2/17/2025 Active   lansoprazole (Prevacid) 15 mg DR capsule [id number] Yes Take 2 capsules (30 mg) by mouth. Manfred Zimmer MD 2/17/2025 Active   loratadine (Claritin) 10 mg tablet [id number] Yes Take 1 tablet (10 mg) by mouth. Manfred Zimmer MD 2/17/2025 Active   melatonin 5 mg tablet [id number] No Take 1 tablet (5 mg) by mouth once daily. Manfred Zimmer MD 2/17/2025 Active   methocarbamol (Robaxin) 500 mg tablet [id number] Yes Take 1 tablet (500 mg) by mouth 2 times a day as needed. Manfred Zimmer MD 2/17/2025 Active   nitroglycerin (Nitrostat) 0.4 mg SL tablet [id number] Yes Place 1 tablet (0.4 mg) under the tongue. Manfred Zimmer MD Unknown Active   ondansetron ODT (Zofran-ODT) 4 mg disintegrating tablet [id number] Yes Dissolve 1 tablet (4 mg) in the mouth every 8 hours if needed. Manfred Zimmer MD 2/18/2025 Active   polyethylene glycol (Glycolax, Miralax) 17 gram/dose powder [id number] Yes Mix of powder and drink. Historical Provider, MD  Active   prochlorperazine (Compazine) 10 mg tablet [id number] Yes Take 1 tablet (10 mg) by mouth. Manfred Zimmer MD Unknown Active   RABEprazole (Aciphex) EC tablet [id number] No TAKE 1 TABLET BY MOUTH TWICE DAILY 30 MINUTES PRIOR TO BREAKFAST AND 30 MINUTES BEFORE DINNER Manfred Zimmer MD 2/17/2025 Active   rivastigmine (Exelon) 4.6 mg/24 hour [id number] No Place on the skin.   Patient not taking: Reported on 2/19/2025    Manfred Zimmer MD Not Taking Active   sennosides-docusate sodium (Lilibeth-Colace) 8.6-50 mg tablet [id number] Yes Take 2 tablets by mouth 2 times a day. Manfred Zimmer MD  Active   sucralfate (Carafate) 100 mg/mL suspension [id number] Yes Take by mouth. Manfred Zimmer MD 2/17/2025 Active   tamsulosin (Flomax) 0.4 mg 24 hr capsule [id number] Yes Take 2 capsules (0.8 mg) by mouth.  "Manfred Provider, MD 2/17/2025 Active   vitamins A,C,E-zinc-copper (ICaps AREDS) 4,296 mcg-226 mg-90 mg capsule [id number] Yes Take 1 capsule by mouth 2 times a day. Manfred Provider, MD 2/17/2025 Active                               Medication compliance: Yes   Uses pill box/organizer: Yes    Carries medication list: Yes     Blood Pressure Management  History of Hypertension: No   Medication Changes: No  08/07/25  Resting BP:  97/59  08/06/25    Heart Failure Management  Hx of Heart Failure: No    Smoking/Tobacco Assessment  Patient is not a smoker. 07/09/25    Other Core Component Plan    Goal Status: In Progress    Other Core Component Goals:   Maintain a resting BP of <130/80 while in the program  Increase knowledge test score from  8/15 to 15/15 by discharge.  Monitor blood pressure pre, during and post workout (in progress).  Meet and discuss knowledge test within the first 12 sessions (goal met, 08/06/25).       Other Core Component Interventions/Education:   Educated the patient on the importance of carrying and updating medication list. 07/09/25  2.   Handout given, \"Tips on taking medication.\" Patient understood well. 07/09/25  3.   Handouts given, \"Benefits of Cardiac Rehab\" and American Heart Association's, \"What is Cardiac Rehab?\" Patient acknowledged understanding. 07/09/25  4.   Went over and discussed knowledge test scores. Went over rationalization and ways to improve score. Patient verbalized understanding. 08/06/25  5.   Current resting blood pressure 97/59, will continue to monitor. 08/06/25        Individual Patient Goals:    Get more exercise   Get my heart stronger    Goal Status: In Progress  Staff Comments:  30 Day Assessment. Patient has completed 10 sessions. Patient is working hard in rehab. He is working towards his MET goal of 4.0. He states that he has overall improved 100% in his strength and endurance since starting rehab. Patient has a history of dementia where he is unable to " retain information, the education book has not been given due to forgetfulness but hand outs are printed off for the wife to keep at home. Patient cardiac monitor has maintained Normal Sinus Rhythm throughout exercise.     Rehab Staff Signature: Fernanda Apodaca RN

## 2025-08-08 ENCOUNTER — CLINICAL SUPPORT (OUTPATIENT)
Dept: CARDIAC REHAB | Facility: HOSPITAL | Age: 85
End: 2025-08-08
Payer: OTHER GOVERNMENT

## 2025-08-08 VITALS — SYSTOLIC BLOOD PRESSURE: 122 MMHG | DIASTOLIC BLOOD PRESSURE: 71 MMHG

## 2025-08-08 DIAGNOSIS — I21.3 ST ELEVATION MYOCARDIAL INFARCTION (STEMI), UNSPECIFIED ARTERY (MULTI): ICD-10-CM

## 2025-08-08 DIAGNOSIS — I21.02 ST ELEVATION MYOCARDIAL INFARCTION INVOLVING LEFT ANTERIOR DESCENDING (LAD) CORONARY ARTERY (MULTI): Primary | ICD-10-CM

## 2025-08-08 PROCEDURE — 93798 PHYS/QHP OP CAR RHAB W/ECG: CPT | Performed by: STUDENT IN AN ORGANIZED HEALTH CARE EDUCATION/TRAINING PROGRAM

## 2025-08-11 ENCOUNTER — CLINICAL SUPPORT (OUTPATIENT)
Dept: CARDIAC REHAB | Facility: HOSPITAL | Age: 85
End: 2025-08-11
Payer: OTHER GOVERNMENT

## 2025-08-11 VITALS — SYSTOLIC BLOOD PRESSURE: 110 MMHG | DIASTOLIC BLOOD PRESSURE: 61 MMHG

## 2025-08-11 DIAGNOSIS — I21.02 ST ELEVATION MYOCARDIAL INFARCTION INVOLVING LEFT ANTERIOR DESCENDING (LAD) CORONARY ARTERY (MULTI): Primary | ICD-10-CM

## 2025-08-11 DIAGNOSIS — I21.3 ST ELEVATION MYOCARDIAL INFARCTION (STEMI), UNSPECIFIED ARTERY (MULTI): ICD-10-CM

## 2025-08-11 PROCEDURE — 93798 PHYS/QHP OP CAR RHAB W/ECG: CPT | Performed by: STUDENT IN AN ORGANIZED HEALTH CARE EDUCATION/TRAINING PROGRAM

## 2025-08-13 ENCOUNTER — CLINICAL SUPPORT (OUTPATIENT)
Dept: CARDIAC REHAB | Facility: HOSPITAL | Age: 85
End: 2025-08-13
Payer: OTHER GOVERNMENT

## 2025-08-13 DIAGNOSIS — I21.3 ST ELEVATION MYOCARDIAL INFARCTION (STEMI), UNSPECIFIED ARTERY (MULTI): ICD-10-CM

## 2025-08-13 DIAGNOSIS — I21.02 ST ELEVATION MYOCARDIAL INFARCTION INVOLVING LEFT ANTERIOR DESCENDING (LAD) CORONARY ARTERY (MULTI): Primary | ICD-10-CM

## 2025-08-13 PROCEDURE — 93798 PHYS/QHP OP CAR RHAB W/ECG: CPT | Performed by: STUDENT IN AN ORGANIZED HEALTH CARE EDUCATION/TRAINING PROGRAM

## 2025-08-15 ENCOUNTER — CLINICAL SUPPORT (OUTPATIENT)
Dept: CARDIAC REHAB | Facility: HOSPITAL | Age: 85
End: 2025-08-15
Payer: OTHER GOVERNMENT

## 2025-08-15 VITALS — SYSTOLIC BLOOD PRESSURE: 112 MMHG | DIASTOLIC BLOOD PRESSURE: 61 MMHG

## 2025-08-15 DIAGNOSIS — I21.02 ST ELEVATION MYOCARDIAL INFARCTION INVOLVING LEFT ANTERIOR DESCENDING (LAD) CORONARY ARTERY (MULTI): Primary | ICD-10-CM

## 2025-08-15 DIAGNOSIS — I21.3 ST ELEVATION MYOCARDIAL INFARCTION (STEMI), UNSPECIFIED ARTERY (MULTI): ICD-10-CM

## 2025-08-15 PROCEDURE — 93798 PHYS/QHP OP CAR RHAB W/ECG: CPT | Performed by: STUDENT IN AN ORGANIZED HEALTH CARE EDUCATION/TRAINING PROGRAM

## 2025-08-18 ENCOUNTER — CLINICAL SUPPORT (OUTPATIENT)
Dept: CARDIAC REHAB | Facility: HOSPITAL | Age: 85
End: 2025-08-18
Payer: OTHER GOVERNMENT

## 2025-08-18 VITALS — DIASTOLIC BLOOD PRESSURE: 71 MMHG | SYSTOLIC BLOOD PRESSURE: 126 MMHG

## 2025-08-18 DIAGNOSIS — I21.3 ST ELEVATION MYOCARDIAL INFARCTION (STEMI), UNSPECIFIED ARTERY (MULTI): ICD-10-CM

## 2025-08-18 DIAGNOSIS — I21.02 ST ELEVATION MYOCARDIAL INFARCTION INVOLVING LEFT ANTERIOR DESCENDING (LAD) CORONARY ARTERY (MULTI): Primary | ICD-10-CM

## 2025-08-18 PROCEDURE — 93798 PHYS/QHP OP CAR RHAB W/ECG: CPT | Performed by: STUDENT IN AN ORGANIZED HEALTH CARE EDUCATION/TRAINING PROGRAM

## 2025-08-20 ENCOUNTER — CLINICAL SUPPORT (OUTPATIENT)
Dept: CARDIAC REHAB | Facility: HOSPITAL | Age: 85
End: 2025-08-20
Payer: OTHER GOVERNMENT

## 2025-08-20 VITALS — SYSTOLIC BLOOD PRESSURE: 131 MMHG | DIASTOLIC BLOOD PRESSURE: 74 MMHG

## 2025-08-20 DIAGNOSIS — I21.02 ST ELEVATION MYOCARDIAL INFARCTION INVOLVING LEFT ANTERIOR DESCENDING (LAD) CORONARY ARTERY (MULTI): Primary | ICD-10-CM

## 2025-08-20 DIAGNOSIS — I21.3 ST ELEVATION MYOCARDIAL INFARCTION (STEMI), UNSPECIFIED ARTERY (MULTI): ICD-10-CM

## 2025-08-20 PROCEDURE — 93798 PHYS/QHP OP CAR RHAB W/ECG: CPT | Performed by: STUDENT IN AN ORGANIZED HEALTH CARE EDUCATION/TRAINING PROGRAM

## 2025-08-22 ENCOUNTER — CLINICAL SUPPORT (OUTPATIENT)
Dept: CARDIAC REHAB | Facility: HOSPITAL | Age: 85
End: 2025-08-22
Payer: OTHER GOVERNMENT

## 2025-08-22 VITALS — SYSTOLIC BLOOD PRESSURE: 90 MMHG | DIASTOLIC BLOOD PRESSURE: 60 MMHG

## 2025-08-22 DIAGNOSIS — I21.3 ST ELEVATION MYOCARDIAL INFARCTION (STEMI), UNSPECIFIED ARTERY (MULTI): ICD-10-CM

## 2025-08-22 DIAGNOSIS — I21.02 ST ELEVATION MYOCARDIAL INFARCTION INVOLVING LEFT ANTERIOR DESCENDING (LAD) CORONARY ARTERY (MULTI): ICD-10-CM

## 2025-08-22 DIAGNOSIS — I25.10 CORONARY ARTERY DISEASE INVOLVING NATIVE CORONARY ARTERY OF NATIVE HEART, UNSPECIFIED WHETHER ANGINA PRESENT: ICD-10-CM

## 2025-08-22 DIAGNOSIS — Z95.5 S/P PRIMARY ANGIOPLASTY WITH CORONARY STENT: Primary | ICD-10-CM

## 2025-08-22 PROCEDURE — 93798 PHYS/QHP OP CAR RHAB W/ECG: CPT | Performed by: STUDENT IN AN ORGANIZED HEALTH CARE EDUCATION/TRAINING PROGRAM

## 2025-08-25 ENCOUNTER — CLINICAL SUPPORT (OUTPATIENT)
Dept: CARDIAC REHAB | Facility: HOSPITAL | Age: 85
End: 2025-08-25
Payer: OTHER GOVERNMENT

## 2025-08-25 VITALS — DIASTOLIC BLOOD PRESSURE: 60 MMHG | SYSTOLIC BLOOD PRESSURE: 93 MMHG

## 2025-08-25 DIAGNOSIS — I21.3 ST ELEVATION MYOCARDIAL INFARCTION (STEMI), UNSPECIFIED ARTERY (MULTI): ICD-10-CM

## 2025-08-25 DIAGNOSIS — I21.02 ST ELEVATION MYOCARDIAL INFARCTION INVOLVING LEFT ANTERIOR DESCENDING (LAD) CORONARY ARTERY (MULTI): Primary | ICD-10-CM

## 2025-08-25 PROCEDURE — 93798 PHYS/QHP OP CAR RHAB W/ECG: CPT | Performed by: STUDENT IN AN ORGANIZED HEALTH CARE EDUCATION/TRAINING PROGRAM

## 2025-08-27 ENCOUNTER — APPOINTMENT (OUTPATIENT)
Dept: CARDIAC REHAB | Facility: HOSPITAL | Age: 85
End: 2025-08-27
Payer: OTHER GOVERNMENT

## 2025-08-29 ENCOUNTER — APPOINTMENT (OUTPATIENT)
Dept: CARDIAC REHAB | Facility: HOSPITAL | Age: 85
End: 2025-08-29
Payer: OTHER GOVERNMENT

## 2025-09-03 ENCOUNTER — CLINICAL SUPPORT (OUTPATIENT)
Dept: CARDIAC REHAB | Facility: HOSPITAL | Age: 85
End: 2025-09-03
Payer: OTHER GOVERNMENT

## 2025-09-03 VITALS — SYSTOLIC BLOOD PRESSURE: 99 MMHG | DIASTOLIC BLOOD PRESSURE: 61 MMHG

## 2025-09-03 DIAGNOSIS — I21.02 ST ELEVATION MYOCARDIAL INFARCTION INVOLVING LEFT ANTERIOR DESCENDING (LAD) CORONARY ARTERY (MULTI): Primary | ICD-10-CM

## 2025-09-03 DIAGNOSIS — I21.3 ST ELEVATION MYOCARDIAL INFARCTION (STEMI), UNSPECIFIED ARTERY (MULTI): ICD-10-CM

## 2025-09-03 PROCEDURE — 93798 PHYS/QHP OP CAR RHAB W/ECG: CPT | Performed by: STUDENT IN AN ORGANIZED HEALTH CARE EDUCATION/TRAINING PROGRAM

## 2025-09-05 ENCOUNTER — CLINICAL SUPPORT (OUTPATIENT)
Dept: CARDIAC REHAB | Facility: HOSPITAL | Age: 85
End: 2025-09-05
Payer: OTHER GOVERNMENT

## 2025-09-05 VITALS — SYSTOLIC BLOOD PRESSURE: 88 MMHG | DIASTOLIC BLOOD PRESSURE: 59 MMHG

## 2025-09-05 DIAGNOSIS — I21.02 ST ELEVATION MYOCARDIAL INFARCTION INVOLVING LEFT ANTERIOR DESCENDING (LAD) CORONARY ARTERY (MULTI): Primary | ICD-10-CM

## 2025-09-05 DIAGNOSIS — I21.3 ST ELEVATION MYOCARDIAL INFARCTION (STEMI), UNSPECIFIED ARTERY (MULTI): ICD-10-CM

## 2025-09-05 PROCEDURE — 93798 PHYS/QHP OP CAR RHAB W/ECG: CPT | Performed by: STUDENT IN AN ORGANIZED HEALTH CARE EDUCATION/TRAINING PROGRAM

## 2025-10-13 ENCOUNTER — APPOINTMENT (OUTPATIENT)
Dept: CARDIAC REHAB | Facility: HOSPITAL | Age: 85
End: 2025-10-13
Payer: OTHER GOVERNMENT

## 2025-10-15 ENCOUNTER — APPOINTMENT (OUTPATIENT)
Dept: CARDIAC REHAB | Facility: HOSPITAL | Age: 85
End: 2025-10-15
Payer: OTHER GOVERNMENT

## 2025-10-17 ENCOUNTER — APPOINTMENT (OUTPATIENT)
Dept: CARDIAC REHAB | Facility: HOSPITAL | Age: 85
End: 2025-10-17
Payer: OTHER GOVERNMENT